# Patient Record
Sex: FEMALE | Race: WHITE | NOT HISPANIC OR LATINO | Employment: FULL TIME
[De-identification: names, ages, dates, MRNs, and addresses within clinical notes are randomized per-mention and may not be internally consistent; named-entity substitution may affect disease eponyms.]

---

## 2017-03-27 ENCOUNTER — HOSPITAL (OUTPATIENT)
Dept: OTHER | Age: 57
End: 2017-03-27
Attending: OBSTETRICS & GYNECOLOGY

## 2017-07-12 ENCOUNTER — HOSPITAL (OUTPATIENT)
Dept: OTHER | Age: 57
End: 2017-07-12
Attending: EMERGENCY MEDICINE

## 2017-07-12 LAB
HCG POINT OF CARE (5HGRST): NEGATIVE
HG POINT OF CARE QC: NORMAL

## 2019-10-28 ENCOUNTER — HOSPITAL (OUTPATIENT)
Dept: OTHER | Age: 59
End: 2019-10-28
Attending: OBSTETRICS & GYNECOLOGY

## 2022-01-06 ENCOUNTER — TELEPHONE (OUTPATIENT)
Dept: SCHEDULING | Age: 62
End: 2022-01-06

## 2022-01-11 ENCOUNTER — TELEPHONE (OUTPATIENT)
Dept: SCHEDULING | Age: 62
End: 2022-01-11

## 2022-01-18 ENCOUNTER — TELEPHONE (OUTPATIENT)
Dept: SCHEDULING | Age: 62
End: 2022-01-18

## 2022-03-11 ENCOUNTER — HOSPITAL ENCOUNTER (EMERGENCY)
Age: 62
Discharge: HOME | End: 2022-03-11
Attending: EMERGENCY MEDICINE

## 2022-03-11 VITALS
HEART RATE: 73 BPM | WEIGHT: 157.63 LBS | OXYGEN SATURATION: 99 % | TEMPERATURE: 97.9 F | RESPIRATION RATE: 18 BRPM | SYSTOLIC BLOOD PRESSURE: 121 MMHG | DIASTOLIC BLOOD PRESSURE: 73 MMHG

## 2022-03-11 DIAGNOSIS — T23.262A PARTIAL THICKNESS BURN OF BACK OF LEFT HAND, INITIAL ENCOUNTER: Primary | ICD-10-CM

## 2022-03-11 PROCEDURE — 99283 EMERGENCY DEPT VISIT LOW MDM: CPT

## 2022-03-11 PROCEDURE — 10002803 HB RX 637: Performed by: EMERGENCY MEDICINE

## 2022-03-11 PROCEDURE — 99283 EMERGENCY DEPT VISIT LOW MDM: CPT | Performed by: EMERGENCY MEDICINE

## 2022-03-11 PROCEDURE — 10004651 HB RX, NO CHARGE ITEM: Performed by: EMERGENCY MEDICINE

## 2022-03-11 RX ORDER — GINSENG 100 MG
CAPSULE ORAL 2 TIMES DAILY
Qty: 15 G | Refills: 0 | Status: SHIPPED | OUTPATIENT
Start: 2022-03-11

## 2022-03-11 RX ORDER — ACETAMINOPHEN 500 MG
1000 TABLET ORAL ONCE
Status: COMPLETED | OUTPATIENT
Start: 2022-03-11 | End: 2022-03-11

## 2022-03-11 RX ORDER — HYDROCODONE BITARTRATE AND ACETAMINOPHEN 5; 325 MG/1; MG/1
1 TABLET ORAL EVERY 6 HOURS PRN
Qty: 8 TABLET | Refills: 0 | Status: SHIPPED | OUTPATIENT
Start: 2022-03-11 | End: 2022-03-31 | Stop reason: ALTCHOICE

## 2022-03-11 RX ORDER — IBUPROFEN 600 MG/1
600 TABLET ORAL ONCE
Status: COMPLETED | OUTPATIENT
Start: 2022-03-11 | End: 2022-03-11

## 2022-03-11 RX ADMIN — ACETAMINOPHEN 1000 MG: 500 TABLET ORAL at 11:49

## 2022-03-11 RX ADMIN — IBUPROFEN 600 MG: 600 TABLET ORAL at 11:49

## 2022-03-11 ASSESSMENT — ENCOUNTER SYMPTOMS
CHILLS: 0
DIZZINESS: 0
NAUSEA: 0
SINUS PAIN: 0
ABDOMINAL PAIN: 0
CONSTITUTIONAL NEGATIVE: 1
VOMITING: 0
FEVER: 0
EYE REDNESS: 0
EYE PAIN: 0
HEADACHES: 0
COUGH: 0
SHORTNESS OF BREATH: 0

## 2022-03-11 ASSESSMENT — PAIN DESCRIPTION - PAIN TYPE: TYPE: ACUTE PAIN

## 2022-03-11 ASSESSMENT — PAIN SCALES - GENERAL: PAINLEVEL_OUTOF10: 6

## 2022-03-30 ENCOUNTER — TELEPHONE (OUTPATIENT)
Dept: SCHEDULING | Age: 62
End: 2022-03-30

## 2022-03-31 ENCOUNTER — WALK IN (OUTPATIENT)
Dept: URGENT CARE | Age: 62
End: 2022-03-31

## 2022-03-31 DIAGNOSIS — Z11.1 SCREENING-PULMONARY TB: Primary | ICD-10-CM

## 2022-03-31 PROCEDURE — 86580 TB INTRADERMAL TEST: CPT | Performed by: NURSE PRACTITIONER

## 2022-03-31 RX ORDER — LEVOTHYROXINE SODIUM 175 UG/1
175 TABLET ORAL DAILY
COMMUNITY
Start: 2022-01-04

## 2022-04-01 ENCOUNTER — TELEPHONE (OUTPATIENT)
Dept: SCHEDULING | Age: 62
End: 2022-04-01

## 2022-04-02 ENCOUNTER — TELEPHONE (OUTPATIENT)
Dept: SCHEDULING | Age: 62
End: 2022-04-02

## 2022-04-02 ENCOUNTER — WALK IN (OUTPATIENT)
Dept: URGENT CARE | Age: 62
End: 2022-04-02

## 2022-04-02 DIAGNOSIS — Z11.1 ENCOUNTER FOR PPD SKIN TEST READING: Primary | ICD-10-CM

## 2022-04-02 LAB
INDURATION: 0 MM (ref 0–?)
SKIN TEST RESULT: NEGATIVE

## 2022-04-02 PROCEDURE — X1094 NO CHARGE VISIT: HCPCS | Performed by: NURSE PRACTITIONER

## 2022-04-15 ENCOUNTER — APPOINTMENT (OUTPATIENT)
Dept: MAMMOGRAPHY | Age: 62
End: 2022-04-15

## 2022-05-07 ENCOUNTER — HOSPITAL ENCOUNTER (OUTPATIENT)
Dept: MAMMOGRAPHY | Age: 62
Discharge: HOME | End: 2022-05-07
Attending: INTERNAL MEDICINE

## 2022-05-07 DIAGNOSIS — Z12.31 ENCOUNTER FOR SCREENING MAMMOGRAM FOR MALIGNANT NEOPLASM OF BREAST: ICD-10-CM

## 2022-05-07 PROCEDURE — 77063 BREAST TOMOSYNTHESIS BI: CPT

## 2023-06-16 ENCOUNTER — APPOINTMENT (OUTPATIENT)
Dept: MAMMOGRAPHY | Age: 63
End: 2023-06-16

## 2023-06-16 ENCOUNTER — OFFICE VISIT (OUTPATIENT)
Dept: INTERNAL MEDICINE CLINIC | Facility: CLINIC | Age: 63
End: 2023-06-16

## 2023-06-16 VITALS
SYSTOLIC BLOOD PRESSURE: 136 MMHG | DIASTOLIC BLOOD PRESSURE: 80 MMHG | TEMPERATURE: 98 F | HEART RATE: 66 BPM | BODY MASS INDEX: 25.71 KG/M2 | HEIGHT: 66 IN | OXYGEN SATURATION: 99 % | WEIGHT: 160 LBS

## 2023-06-16 DIAGNOSIS — I77.71 DISSECTION OF CAROTID ARTERY (HCC): ICD-10-CM

## 2023-06-16 DIAGNOSIS — R92.8 ABNORMAL MAMMOGRAM: ICD-10-CM

## 2023-06-16 DIAGNOSIS — I71.20 THORACIC AORTIC ANEURYSM (TAA), UNSPECIFIED PART, UNSPECIFIED WHETHER RUPTURED (HCC): ICD-10-CM

## 2023-06-16 DIAGNOSIS — M25.561 ACUTE PAIN OF RIGHT KNEE: ICD-10-CM

## 2023-06-16 DIAGNOSIS — Z00.00 PHYSICAL EXAM: Primary | ICD-10-CM

## 2023-06-16 DIAGNOSIS — F41.0 PANIC DISORDER: ICD-10-CM

## 2023-06-16 PROCEDURE — 3075F SYST BP GE 130 - 139MM HG: CPT | Performed by: INTERNAL MEDICINE

## 2023-06-16 PROCEDURE — 99204 OFFICE O/P NEW MOD 45 MIN: CPT | Performed by: INTERNAL MEDICINE

## 2023-06-16 PROCEDURE — 3008F BODY MASS INDEX DOCD: CPT | Performed by: INTERNAL MEDICINE

## 2023-06-16 PROCEDURE — 3079F DIAST BP 80-89 MM HG: CPT | Performed by: INTERNAL MEDICINE

## 2023-06-16 RX ORDER — MULTIVITAMIN
1 CAPSULE ORAL AS DIRECTED
COMMUNITY

## 2023-06-16 RX ORDER — GINSENG 100 MG
1 CAPSULE ORAL 2 TIMES DAILY
COMMUNITY
Start: 2022-03-11 | End: 2023-06-16

## 2023-06-16 RX ORDER — LEVOTHYROXINE SODIUM 0.07 MG/1
75 TABLET ORAL
COMMUNITY

## 2023-06-16 NOTE — PATIENT INSTRUCTIONS
1. Physical exam  Physical exam instruction: Improve diet and exercise, complete fasting labs in the near future and you will be called with results 5-7 days after completed, call with questions. Call the central scheduling number at [phone] to schedule at any of the [facility] locations  -With the TB testing, paperwork for physical form completed for occupation  - [address]; Future  - COMP METABOLIC PANEL (14); Future  - LIPID PANEL; Future  - TSH W REFLEX TO FREE T4; Future  - URINALYSIS WITH CULTURE REFLEX  - VITAMIN D; Future  - QUANTIFERON TB; Future    2. Dissection of carotid artery (HCC)  Stable continue current monitoring management, outpatient follow-up    3. Acute pain of right knee  Lets get this imaged, start with some anti-inflammatories 2-3 times a day, ice only at this point, range of motion exercises, and lets establish with the orthopedic doctor to take a look in the next few weeks. I did enter orders for x-rays lets get these done as well before going in for that visit  No strenuous exercise on this area until cleared by the orthopedic surgeon  - XR KNEE (3 VIEWS), RIGHT (CPT=73562); Future    4. Thoracic aortic aneurysm (TAA), unspecified part, unspecified whether ruptured [facility])  Echocardiogram with aortic arch views  - CARD ECHO 2D DOPPLER (CPT=93306); Future    5. Abnormal mammogram  Stable continue current monitoring management, outpatient follow-up with OB/GYN he    6. Panic disorder  Stable continue current treatments here.

## 2023-06-17 ENCOUNTER — HOSPITAL ENCOUNTER (OUTPATIENT)
Dept: GENERAL RADIOLOGY | Facility: HOSPITAL | Age: 63
Discharge: HOME | End: 2023-06-17
Attending: INTERNAL MEDICINE
Payer: COMMERCIAL

## 2023-06-17 ENCOUNTER — LAB ENCOUNTER (OUTPATIENT)
Dept: LAB | Facility: HOSPITAL | Age: 63
End: 2023-06-17
Attending: INTERNAL MEDICINE
Payer: COMMERCIAL

## 2023-06-17 DIAGNOSIS — Z00.00 PHYSICAL EXAM: ICD-10-CM

## 2023-06-17 DIAGNOSIS — M25.561 ACUTE PAIN OF RIGHT KNEE: ICD-10-CM

## 2023-06-17 LAB
ALBUMIN SERPL-MCNC: 3.9 G/DL (ref 3.4–5)
ALBUMIN/GLOB SERPL: 1.1 {RATIO} (ref 1–2)
ALP LIVER SERPL-CCNC: 95 U/L
ALT SERPL-CCNC: 24 U/L
ANION GAP SERPL CALC-SCNC: 4 MMOL/L (ref 0–18)
AST SERPL-CCNC: 16 U/L (ref 15–37)
BASOPHILS # BLD AUTO: 0.04 X10(3) UL (ref 0–0.2)
BASOPHILS NFR BLD AUTO: 0.8 %
BILIRUB SERPL-MCNC: 1.4 MG/DL (ref 0.1–2)
BILIRUB UR QL: NEGATIVE
BUN BLD-MCNC: 15 MG/DL (ref 7–18)
BUN/CREAT SERPL: 19 (ref 10–20)
CALCIUM BLD-MCNC: 9 MG/DL (ref 8.5–10.1)
CHLORIDE SERPL-SCNC: 107 MMOL/L (ref 98–112)
CHOLEST SERPL-MCNC: 194 MG/DL (ref ?–200)
CLARITY UR: CLEAR
CO2 SERPL-SCNC: 28 MMOL/L (ref 21–32)
CREAT BLD-MCNC: 0.79 MG/DL
DEPRECATED RDW RBC AUTO: 44 FL (ref 35.1–46.3)
EOSINOPHIL # BLD AUTO: 0.25 X10(3) UL (ref 0–0.7)
EOSINOPHIL NFR BLD AUTO: 4.8 %
ERYTHROCYTE [DISTWIDTH] IN BLOOD BY AUTOMATED COUNT: 12.7 % (ref 11–15)
FASTING PATIENT LIPID ANSWER: YES
FASTING STATUS PATIENT QL REPORTED: YES
GFR SERPLBLD BASED ON 1.73 SQ M-ARVRAT: 85 ML/MIN/1.73M2 (ref 60–?)
GLOBULIN PLAS-MCNC: 3.4 G/DL (ref 2.8–4.4)
GLUCOSE BLD-MCNC: 88 MG/DL (ref 70–99)
GLUCOSE UR-MCNC: NORMAL MG/DL
HCT VFR BLD AUTO: 43.1 %
HDLC SERPL-MCNC: 67 MG/DL (ref 40–59)
HGB BLD-MCNC: 14.4 G/DL
HGB UR QL STRIP.AUTO: NEGATIVE
HYALINE CASTS #/AREA URNS AUTO: PRESENT /LPF
IMM GRANULOCYTES # BLD AUTO: 0.02 X10(3) UL (ref 0–1)
IMM GRANULOCYTES NFR BLD: 0.4 %
KETONES UR-MCNC: NEGATIVE MG/DL
LDLC SERPL CALC-MCNC: 112 MG/DL (ref ?–100)
LEUKOCYTE ESTERASE UR QL STRIP.AUTO: NEGATIVE
LYMPHOCYTES # BLD AUTO: 1.58 X10(3) UL (ref 1–4)
LYMPHOCYTES NFR BLD AUTO: 30.4 %
MCH RBC QN AUTO: 31.4 PG (ref 26–34)
MCHC RBC AUTO-ENTMCNC: 33.4 G/DL (ref 31–37)
MCV RBC AUTO: 93.9 FL
MONOCYTES # BLD AUTO: 0.36 X10(3) UL (ref 0.1–1)
MONOCYTES NFR BLD AUTO: 6.9 %
NEUTROPHILS # BLD AUTO: 2.94 X10 (3) UL (ref 1.5–7.7)
NEUTROPHILS # BLD AUTO: 2.94 X10(3) UL (ref 1.5–7.7)
NEUTROPHILS NFR BLD AUTO: 56.7 %
NITRITE UR QL STRIP.AUTO: NEGATIVE
NONHDLC SERPL-MCNC: 127 MG/DL (ref ?–130)
OSMOLALITY SERPL CALC.SUM OF ELEC: 288 MOSM/KG (ref 275–295)
PH UR: 5.5 [PH] (ref 5–8)
PLATELET # BLD AUTO: 221 10(3)UL (ref 150–450)
POTASSIUM SERPL-SCNC: 4.9 MMOL/L (ref 3.5–5.1)
PROT SERPL-MCNC: 7.3 G/DL (ref 6.4–8.2)
PROT UR-MCNC: NEGATIVE MG/DL
RBC # BLD AUTO: 4.59 X10(6)UL
SODIUM SERPL-SCNC: 139 MMOL/L (ref 136–145)
SP GR UR STRIP: 1.02 (ref 1–1.03)
T3FREE SERPL-MCNC: 2.56 PG/ML (ref 2.4–4.2)
T4 FREE SERPL-MCNC: 1 NG/DL (ref 0.8–1.7)
TRIGL SERPL-MCNC: 84 MG/DL (ref 30–149)
TSI SER-ACNC: 0.17 MIU/ML (ref 0.36–3.74)
UROBILINOGEN UR STRIP-ACNC: NORMAL
VIT D+METAB SERPL-MCNC: 20.7 NG/ML (ref 30–100)
VLDLC SERPL CALC-MCNC: 14 MG/DL (ref 0–30)
WBC # BLD AUTO: 5.2 X10(3) UL (ref 4–11)

## 2023-06-17 PROCEDURE — 84439 ASSAY OF FREE THYROXINE: CPT

## 2023-06-17 PROCEDURE — 86480 TB TEST CELL IMMUN MEASURE: CPT

## 2023-06-17 PROCEDURE — 84481 FREE ASSAY (FT-3): CPT

## 2023-06-17 PROCEDURE — 36415 COLL VENOUS BLD VENIPUNCTURE: CPT

## 2023-06-17 PROCEDURE — 80053 COMPREHEN METABOLIC PANEL: CPT

## 2023-06-17 PROCEDURE — 80061 LIPID PANEL: CPT

## 2023-06-17 PROCEDURE — 73562 X-RAY EXAM OF KNEE 3: CPT | Performed by: INTERNAL MEDICINE

## 2023-06-17 PROCEDURE — 85025 COMPLETE CBC W/AUTO DIFF WBC: CPT

## 2023-06-17 PROCEDURE — 82306 VITAMIN D 25 HYDROXY: CPT

## 2023-06-17 PROCEDURE — 84443 ASSAY THYROID STIM HORMONE: CPT

## 2023-06-19 LAB
M TB IFN-G CD4+ T-CELLS BLD-ACNC: 0.02 IU/ML
M TB TUBERC IFN-G BLD QL: NEGATIVE
M TB TUBERC IGNF/MITOGEN IGNF CONTROL: >10 IU/ML
QFT TB1 AG MINUS NIL: 0.02 IU/ML
QFT TB2 AG MINUS NIL: 0.02 IU/ML

## 2023-06-27 ENCOUNTER — APPOINTMENT (OUTPATIENT)
Dept: MAMMOGRAPHY | Age: 63
End: 2023-06-27

## 2023-07-07 ENCOUNTER — TELEPHONE (OUTPATIENT)
Dept: INTERNAL MEDICINE CLINIC | Facility: CLINIC | Age: 63
End: 2023-07-07

## 2023-07-07 ENCOUNTER — APPOINTMENT (OUTPATIENT)
Dept: MAMMOGRAPHY | Age: 63
End: 2023-07-07

## 2023-07-07 NOTE — TELEPHONE ENCOUNTER
----- Message from Leonid Macedo sent at 7/7/2023  2:31 PM CDT -----  Regarding: Pre op vusit  Contact: [phone]  Hi Dr Judy Espinosa    It turns out I have a torn meniscus and would like to have the surgery done with Dr Venita Acosta on Aug 2nd. I need a preop visit within 30 days  before, but could not do this online. Would you have anything available tge wee of July 24th? Thank you please let me know.   Concepcion Galloway

## 2023-07-10 NOTE — TELEPHONE ENCOUNTER
Received request for pre-op clearance via fax from [facility] Dr Froilan Rae. Patients surgery is scheduled for 07/28/23. Patient has appointment on July 25 with Dr Kelley Ramirez in Dr Lu Briceno mail box. Haile Barrios

## 2023-07-24 ENCOUNTER — PATIENT MESSAGE (OUTPATIENT)
Dept: INTERNAL MEDICINE CLINIC | Facility: CLINIC | Age: 63
End: 2023-07-24

## 2023-07-25 ENCOUNTER — OFFICE VISIT (OUTPATIENT)
Dept: INTERNAL MEDICINE CLINIC | Facility: CLINIC | Age: 63
End: 2023-07-25

## 2023-07-25 VITALS
SYSTOLIC BLOOD PRESSURE: 96 MMHG | HEART RATE: 62 BPM | WEIGHT: 154.38 LBS | OXYGEN SATURATION: 99 % | DIASTOLIC BLOOD PRESSURE: 70 MMHG | HEIGHT: 66 IN | TEMPERATURE: 98 F | BODY MASS INDEX: 24.81 KG/M2

## 2023-07-25 DIAGNOSIS — S83.203D OTHER TEAR OF MENISCUS OF RIGHT KNEE, UNSPECIFIED MENISCUS, UNSPECIFIED WHETHER OLD OR CURRENT TEAR, SUBSEQUENT ENCOUNTER: ICD-10-CM

## 2023-07-25 DIAGNOSIS — Z01.810 PREOP CARDIOVASCULAR EXAM: Primary | ICD-10-CM
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
## 2023-07-25 DIAGNOSIS — I71.20 THORACIC AORTIC ANEURYSM (TAA), UNSPECIFIED PART, UNSPECIFIED WHETHER RUPTURED (HCC): ICD-10-CM

## 2023-07-25 DIAGNOSIS — S60.212D CONTUSION OF LEFT WRIST, SUBSEQUENT ENCOUNTER: ICD-10-CM

## 2023-07-25 PROBLEM — M25.561 ACUTE PAIN OF RIGHT KNEE: Status: RESOLVED | Noted: 2023-06-16 | Resolved: 2023-07-25

## 2023-07-25 PROBLEM — S60.212A CONTUSION OF LEFT WRIST: Status: ACTIVE | Noted: 2023-07-25

## 2023-07-25 PROBLEM — S83.206A TEAR OF MENISCUS OF RIGHT KNEE: Status: ACTIVE | Noted: 2023-07-25

## 2023-07-25 LAB
ATRIAL RATE: 60 BPM
P AXIS: 29 DEGREES
P-R INTERVAL: 148 MS
Q-T INTERVAL: 424 MS
QRS DURATION: 76 MS
QTC CALCULATION (BEZET): 424 MS
R AXIS: 56 DEGREES
T AXIS: 33 DEGREES
VENTRICULAR RATE: 60 BPM

## 2023-07-25 PROCEDURE — 3074F SYST BP LT 130 MM HG: CPT | Performed by: INTERNAL MEDICINE

## 2023-07-25 PROCEDURE — 93000 ELECTROCARDIOGRAM COMPLETE: CPT | Performed by: INTERNAL MEDICINE

## 2023-07-25 PROCEDURE — 3008F BODY MASS INDEX DOCD: CPT | Performed by: INTERNAL MEDICINE

## 2023-07-25 PROCEDURE — 99214 OFFICE O/P EST MOD 30 MIN: CPT | Performed by: INTERNAL MEDICINE

## 2023-07-25 PROCEDURE — 3078F DIAST BP <80 MM HG: CPT | Performed by: INTERNAL MEDICINE

## 2023-07-25 RX ORDER — METOPROLOL SUCCINATE 25 MG/1
25 TABLET, EXTENDED RELEASE ORAL DAILY
COMMUNITY
Start: 2023-07-19 | End: 2023-07-25

## 2023-07-25 RX ORDER — METOPROLOL SUCCINATE 25 MG/1
12.5 TABLET, EXTENDED RELEASE ORAL DAILY
Qty: 45 TABLET | Refills: 1 | COMMUNITY
Start: 2023-07-25

## 2023-07-25 NOTE — PATIENT INSTRUCTIONS
1. Preop cardiovascular exam  Preop statement: This patient is in optimal medical condition for proposed surgery, procede, pre-operative testing reviewed and renny-operative b-blocker is recommended to continue renny-op antibiotic per surgery  - ELECTROCARDIOGRAM, COMPLETE: Sinus rhythm at 60 bpm, no ST-T wave changes, normal EKG    2. Other tear of meniscus of right knee, unspecified meniscus, unspecified whether old or current tear, subsequent encounter  I Like the idea of still getting this worked on I know it is feeling better, lets continue with the plan for Friday and I will submit the presurgical clearance    3. Contusion of left wrist, subsequent encounter  Continue with current outpatient follow-up, lets try to do without the brace for sometimes at home, working the range of motion exercises here    4. Fall, subsequent encounter  Stable and resolved, accidental    5. Thoracic aortic aneurysm (TAA), unspecified part, unspecified whether ruptured ([facility])  Lets lower the metoprolol to 1/2 tablet nightly starting tonight, letting me know if you are feeling any other symptoms, lets communicate with the specialist about this as well. - metoprolol succinate ER 25 MG Oral Tablet 24 Hr; Take 0.5 tablets (12.5 mg total) by mouth daily. Dispense: 45 tablet;  Refill: 1

## 2023-07-27 ENCOUNTER — TELEPHONE (OUTPATIENT)
Dept: INTERNAL MEDICINE CLINIC | Facility: CLINIC | Age: 63
End: 2023-07-27

## 2023-07-27 NOTE — TELEPHONE ENCOUNTER
7/25 H&P and EKG faxed to:    Dr. Carin Lambert, [facility]   Fax #: [phone]    Fax confirmation received. Pre-operative testing request form sent to scanning.

## 2023-07-31 ENCOUNTER — TELEPHONE (OUTPATIENT)
Dept: INTERNAL MEDICINE CLINIC | Facility: CLINIC | Age: 63
End: 2023-07-31

## 2023-07-31 NOTE — TELEPHONE ENCOUNTER
Pt spoke to Dr Johnie Lay (orthopedic surgery from [facility]) yesterday, and advised to stop taking Norco and Meloxicam.  Nausea symptoms relieved

## 2023-07-31 NOTE — TELEPHONE ENCOUNTER
----- Message from Yaz Carrillo sent at 7/30/2023 10:19 AM CDT -----  Regarding: Surgery   Contact: [phone]  Hi Dr Susan Lancaster,    I had surgery Friday, and knee pain is not bad. But, I was prescribed norco, aspirin, meloxicam, and am on metaprolol. I stopped norco Friday. But took Tylenol yesterday. I have been nauseous since Friday night. I also messaged my surgeon . . are my issues due to aspirin ? Meloxicam and Tylenol? Can I stop meloxicam and aspirin?      Thanks,  Arturo Durham

## 2023-08-26 ENCOUNTER — PATIENT MESSAGE (OUTPATIENT)
Dept: INTERNAL MEDICINE CLINIC | Facility: CLINIC | Age: 63
End: 2023-08-26

## 2023-09-30 ENCOUNTER — HOSPITAL ENCOUNTER (OUTPATIENT)
Dept: MAMMOGRAPHY | Age: 63
Discharge: HOME | End: 2023-09-30
Attending: INTERNAL MEDICINE

## 2023-09-30 DIAGNOSIS — Z12.31 ENCOUNTER FOR SCREENING MAMMOGRAM FOR MALIGNANT NEOPLASM OF BREAST: ICD-10-CM

## 2023-09-30 PROCEDURE — 77067 SCR MAMMO BI INCL CAD: CPT

## 2023-10-15 ENCOUNTER — PATIENT MESSAGE (OUTPATIENT)
Dept: INTERNAL MEDICINE CLINIC | Facility: CLINIC | Age: 63
End: 2023-10-15

## 2023-10-15 DIAGNOSIS — Z86.79 HISTORY OF CAROTID ARTERY DISSECTION: Primary | ICD-10-CM

## 2023-10-15 DIAGNOSIS — I77.810 ASCENDING AORTA DILATION (HCC): ICD-10-CM

## 2023-10-16 ENCOUNTER — TELEPHONE (OUTPATIENT)
Dept: INTERNAL MEDICINE CLINIC | Facility: CLINIC | Age: 63
End: 2023-10-16

## 2023-10-16 NOTE — TELEPHONE ENCOUNTER
From: [facility] Board  To: Natividad Guarneri D'Amico  Sent: 10/15/2023 5:51 AM CDT  Subject: CTA scan referral request    Hello,    I will need another gated Head/neck scan (from [place] [facility] [place]) Khushbu Hilliard have new Network [employer], attached here. I have an appointment scheduled in November. Please let me know if there is anything j need to do.     Thank you,  Cat Cruz

## 2023-10-16 NOTE — TELEPHONE ENCOUNTER
----- Message from Lauren Ryan sent at 10/15/2023  5:51 AM CDT -----  Regarding: CTA scan referral request  Contact: [phone]  Hello,    I will need another gated Head/neck scan (from my [facility] cardiologist) Tabby Layne have new [employer], attached here. I have an appointment scheduled in November. Please let me know if there is anything j need to do.     Thank you,   Has

## 2023-10-16 NOTE — TELEPHONE ENCOUNTER
Routed to  to assist patient in updating insurance information. Insurance Card image is blurry in attachment. Routed to Clinical [facility]; [address] REQUESTING CPT / DX INFO    You  Aleida Wiggins now (3:28 PM)     [facility]   The insurance card photos you sent were blurry. Do you have orders for these tests? Is the Imaging for CT Angiogram Head and a CT Angiogram Carotid? I understand there has recently been a change in the PCP office working on authorization but we will need the exact test and procedure code/s along with associated diagnosis. Please assist. I will ask our  to reach out via phone to gather insurance details.

## 2023-10-22 ENCOUNTER — TELEPHONE (OUTPATIENT)
Dept: INTERNAL MEDICINE CLINIC | Facility: CLINIC | Age: 63
End: 2023-10-22

## 2023-10-22 ENCOUNTER — PATIENT MESSAGE (OUTPATIENT)
Dept: INTERNAL MEDICINE CLINIC | Facility: CLINIC | Age: 63
End: 2023-10-22

## 2023-10-22 RX ORDER — MULTIVITAMIN
1 CAPSULE ORAL AS DIRECTED
Refills: 0 | Status: CANCELLED | OUTPATIENT
Start: 2023-10-22

## 2023-10-22 RX ORDER — LEVOTHYROXINE SODIUM 0.07 MG/1
75 TABLET ORAL
Refills: 0 | Status: CANCELLED | OUTPATIENT
Start: 2023-10-22

## 2023-10-23 RX ORDER — LEVOTHYROXINE SODIUM 175 UG/1
175 TABLET ORAL DAILY
COMMUNITY
End: 2023-10-23

## 2023-10-23 NOTE — TELEPHONE ENCOUNTER
Left message to call back       From: Casimiro Loop  To: Leonora Glatter D'Amico  Sent: 10/22/2023 10:09 AM CDT  Subject: Levothyroxine. . wrong dise    Hello , I just requested a refill of levothyroxine, but my dose should be 175 mcg but listed as 75 mcg. Please correct in your system .  Thank you

## 2023-10-23 NOTE — TELEPHONE ENCOUNTER
Patient called. She is out of  thyroid medication. Routed to [facility] to assist today.

## 2023-10-23 NOTE — TELEPHONE ENCOUNTER
Kylah Rivera [facility] Clinical Staff (supporting Eduardo Garcia DO)Yesterday (10:09 AM)     Hello , I just requested a refill of levothyroxine, but my dose should be 175 mcg but listed as 75 mcg. Please correct in your system .  Thank you

## 2023-10-24 RX ORDER — LEVOTHYROXINE SODIUM 175 UG/1
175 TABLET ORAL DAILY
Qty: 90 TABLET | Refills: 3 | Status: SHIPPED | OUTPATIENT
Start: 2023-10-24 | End: 2024-10-18

## 2023-11-29 ENCOUNTER — PATIENT MESSAGE (OUTPATIENT)
Dept: INTERNAL MEDICINE CLINIC | Facility: CLINIC | Age: 63
End: 2023-11-29

## 2023-12-29 ENCOUNTER — TELEPHONE (OUTPATIENT)
Dept: INTERNAL MEDICINE CLINIC | Facility: CLINIC | Age: 63
End: 2023-12-29

## 2023-12-29 ENCOUNTER — PATIENT MESSAGE (OUTPATIENT)
Dept: INTERNAL MEDICINE CLINIC | Facility: CLINIC | Age: 63
End: 2023-12-29

## 2023-12-29 DIAGNOSIS — Z23 NEED FOR VACCINATION: Primary | ICD-10-CM

## 2023-12-29 NOTE — TELEPHONE ENCOUNTER
RSV vaccine order faxed [address] in [place], [place] at:    [phone]         Fax confirmation received. Vaccine order mailed to pt's home address.       Left voicemail for Trina Heller with update -- advised she call back with any questions or concerns

## 2023-12-29 NOTE — TELEPHONE ENCOUNTER
Patient is calling to request an order for a RSV Vaccination    Patient will be going to a pharmacy but her Ins requires an order    Please mail to home address confirmed

## 2023-12-29 NOTE — TELEPHONE ENCOUNTER
From: Emeli Roberts  To: Gwyndolyn Arab D'Amico  Sent: 12/29/2023 1:54 PM CST  Subject: Rsv vaccine    Hi Dr.D'Amico,    I hope you are enjoying the holidays! Leni Harrington and I are trying to get the RSV vaccine. Our pharmacist said we have to go through our pcp( you). However, your office does not carry it. [facility] said that you could give us a \"voucher\". . to take to our pharmacy, since you don't carry it. Please let me know, as we would like to get the vaccine asap.   Thank you,  Herb Sun

## 2023-12-29 NOTE — TELEPHONE ENCOUNTER
----- Message from Dung Veronica sent at 12/29/2023  1:54 PM CST -----  Regarding: Rsv vaccine  Contact: [phone]  Hi Dr.D'Amico,    I hope you are enjoying the holidays! Oneil Pedersen and I are trying to get the RSV vaccine. Our pharmacist said we have to go through our pcp( you). However, your office does not carry it. [facility] said that you could give us a \"voucher\". . to take to our pharmacy, since you don't carry it. Please let me know, as we would like to get the vaccine asap.   Thank you,  Renetta Gastelum

## 2024-01-03 ENCOUNTER — TELEPHONE (OUTPATIENT)
Dept: INTERNAL MEDICINE CLINIC | Facility: CLINIC | Age: 64
End: 2024-01-03

## 2024-01-03 NOTE — TELEPHONE ENCOUNTER
Candy from [facility] Is calling regarding the patient CT A which was done on 11/13/2023.     Jose states she needs the following     Authorization dates  Authorization #    For CT A with code [id number]    Please fax to [phone]

## 2024-01-11 NOTE — TELEPHONE ENCOUNTER
Bhanu [place]/EARNEST--    The test was already completed at [facility] on 11/13. Is there a way to retro date/get authorization for this?

## 2024-01-11 NOTE — TELEPHONE ENCOUNTER
Hello     This was ordered for [facility]. Per patient health plan imaging needs to be done in network.     Thank you  [place]  Referral specialist

## 2024-01-12 NOTE — TELEPHONE ENCOUNTER
Hello     Unfortunately, there is no way. The health plan requires patient to have all testing done at in network facility. The health plan does not do retro request.     Thank you,  [place]  Referral specialist

## 2024-07-23 RX ORDER — LEVOTHYROXINE SODIUM 175 UG/1
175 TABLET ORAL DAILY
Qty: 90 TABLET | Refills: 3 | OUTPATIENT
Start: 2024-07-23

## 2024-07-23 NOTE — TELEPHONE ENCOUNTER
Current refill request refused due to refill is either a duplicate request or has active refills at the pharmacy.  Check previous templates.    Requested Prescriptions     Refused Prescriptions Disp Refills    LEVOTHYROXINE 175 MCG Oral Tab [Pharmacy Med Name: LEVOTHYROXINE 0.175MG (175MCG) TABS] 90 tablet 3     Sig: TAKE 1 TABLET(175 MCG) BY MOUTH DAILY     Refused By: AKHIL KELLER     Reason for Refusal: Patient has requested refill too soon

## 2024-11-02 ENCOUNTER — HOSPITAL ENCOUNTER (OUTPATIENT)
Dept: MAMMOGRAPHY | Age: 64
Discharge: HOME | End: 2024-11-02

## 2024-11-02 DIAGNOSIS — Z12.31 ENCOUNTER FOR SCREENING MAMMOGRAM FOR MALIGNANT NEOPLASM OF BREAST: ICD-10-CM

## 2024-11-02 PROCEDURE — 77063 BREAST TOMOSYNTHESIS BI: CPT

## 2024-11-14 ENCOUNTER — PATIENT MESSAGE (OUTPATIENT)
Dept: INTERNAL MEDICINE CLINIC | Facility: CLINIC | Age: 64
End: 2024-11-14

## 2024-11-15 RX ORDER — LEVOTHYROXINE SODIUM 175 UG/1
175 TABLET ORAL
Qty: 90 TABLET | Refills: 0 | Status: SHIPPED | OUTPATIENT
Start: 2024-11-15

## 2024-11-15 NOTE — TELEPHONE ENCOUNTER
Overdue for visit. Has appt scheduled in Feb    Refill request is for a maintenance medication and has met the criteria specified in the Ambulatory Medication Refill Standing Order for eligibility, visits, laboratory, alerts and was sent to the requested pharmacy.    Requested Prescriptions     Signed Prescriptions Disp Refills    levothyroxine 175 MCG Oral Tab 90 tablet 0     Sig: Take 1 tablet (175 mcg total) by mouth before breakfast.     Authorizing Provider: D'AMICO, JEFF ANTHONY     Ordering User: AKHIL KELLER

## 2024-11-16 RX ORDER — LEVOTHYROXINE SODIUM 175 UG/1
175 TABLET ORAL
Qty: 90 TABLET | Refills: 0 | Status: CANCELLED | OUTPATIENT
Start: 2024-11-16

## 2025-02-01 ENCOUNTER — APPOINTMENT (OUTPATIENT)
Dept: CT IMAGING | Age: 65
End: 2025-02-01

## 2025-02-01 ENCOUNTER — HOSPITAL ENCOUNTER (EMERGENCY)
Age: 65
Discharge: HOME | End: 2025-02-01
Attending: EMERGENCY MEDICINE

## 2025-02-01 ENCOUNTER — APPOINTMENT (OUTPATIENT)
Dept: GENERAL RADIOLOGY | Age: 65
End: 2025-02-01

## 2025-02-01 VITALS
SYSTOLIC BLOOD PRESSURE: 118 MMHG | RESPIRATION RATE: 17 BRPM | HEART RATE: 64 BPM | OXYGEN SATURATION: 99 % | TEMPERATURE: 98.1 F | DIASTOLIC BLOOD PRESSURE: 74 MMHG
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
## 2025-02-01 PROCEDURE — 99284 EMERGENCY DEPT VISIT MOD MDM: CPT | Performed by: EMERGENCY MEDICINE

## 2025-02-01 PROCEDURE — 10004651 HB RX, NO CHARGE ITEM: Performed by: EMERGENCY MEDICINE

## 2025-02-01 PROCEDURE — 70450 CT HEAD/BRAIN W/O DYE: CPT

## 2025-02-01 PROCEDURE — 73080 X-RAY EXAM OF ELBOW: CPT

## 2025-02-01 PROCEDURE — 72125 CT NECK SPINE W/O DYE: CPT

## 2025-02-01 PROCEDURE — 99284 EMERGENCY DEPT VISIT MOD MDM: CPT

## 2025-02-01 PROCEDURE — 73090 X-RAY EXAM OF FOREARM: CPT

## 2025-02-01 RX ORDER — ACETAMINOPHEN 325 MG/1
975 TABLET ORAL ONCE
Status: COMPLETED | OUTPATIENT
Start: 2025-02-01 | End: 2025-02-01

## 2025-02-01 RX ADMIN — ACETAMINOPHEN 975 MG: 325 TABLET ORAL at 10:52

## 2025-02-01 ASSESSMENT — PAIN SCALES - GENERAL
PAINLEVEL_OUTOF10: 5
PAINLEVEL_OUTOF10: 7
PAINLEVEL_OUTOF10: 5

## 2025-03-25 RX ORDER — LEVOTHYROXINE SODIUM 175 UG/1
175 TABLET ORAL
Qty: 90 TABLET | Refills: 0 | OUTPATIENT
Start: 2025-03-25